# Patient Record
Sex: MALE | Race: OTHER | HISPANIC OR LATINO | Employment: FULL TIME | URBAN - METROPOLITAN AREA
[De-identification: names, ages, dates, MRNs, and addresses within clinical notes are randomized per-mention and may not be internally consistent; named-entity substitution may affect disease eponyms.]

---

## 2023-07-15 ENCOUNTER — HOSPITAL ENCOUNTER (EMERGENCY)
Facility: MEDICAL CENTER | Age: 28
End: 2023-07-15
Attending: STUDENT IN AN ORGANIZED HEALTH CARE EDUCATION/TRAINING PROGRAM

## 2023-07-15 ENCOUNTER — APPOINTMENT (OUTPATIENT)
Dept: RADIOLOGY | Facility: MEDICAL CENTER | Age: 28
End: 2023-07-15
Attending: STUDENT IN AN ORGANIZED HEALTH CARE EDUCATION/TRAINING PROGRAM

## 2023-07-15 VITALS
SYSTOLIC BLOOD PRESSURE: 118 MMHG | DIASTOLIC BLOOD PRESSURE: 80 MMHG | RESPIRATION RATE: 16 BRPM | BODY MASS INDEX: 26.71 KG/M2 | OXYGEN SATURATION: 98 % | TEMPERATURE: 98.1 F | HEART RATE: 60 BPM | HEIGHT: 66 IN | WEIGHT: 166.23 LBS

## 2023-07-15 DIAGNOSIS — R10.9 LEFT SIDED ABDOMINAL PAIN: ICD-10-CM

## 2023-07-15 LAB
ALBUMIN SERPL BCP-MCNC: 4.6 G/DL (ref 3.2–4.9)
ALBUMIN/GLOB SERPL: 1.5 G/DL
ALP SERPL-CCNC: 81 U/L (ref 30–99)
ALT SERPL-CCNC: 19 U/L (ref 2–50)
ANION GAP SERPL CALC-SCNC: 12 MMOL/L (ref 7–16)
APPEARANCE UR: CLEAR
AST SERPL-CCNC: 17 U/L (ref 12–45)
BASOPHILS # BLD AUTO: 0.3 % (ref 0–1.8)
BASOPHILS # BLD: 0.02 K/UL (ref 0–0.12)
BILIRUB SERPL-MCNC: 0.3 MG/DL (ref 0.1–1.5)
BILIRUB UR QL STRIP.AUTO: NEGATIVE
BUN SERPL-MCNC: 16 MG/DL (ref 8–22)
CALCIUM ALBUM COR SERPL-MCNC: 8.7 MG/DL (ref 8.5–10.5)
CALCIUM SERPL-MCNC: 9.2 MG/DL (ref 8.4–10.2)
CHLORIDE SERPL-SCNC: 106 MMOL/L (ref 96–112)
CO2 SERPL-SCNC: 24 MMOL/L (ref 20–33)
COLOR UR: YELLOW
CREAT SERPL-MCNC: 0.89 MG/DL (ref 0.5–1.4)
EOSINOPHIL # BLD AUTO: 0.35 K/UL (ref 0–0.51)
EOSINOPHIL NFR BLD: 4.9 % (ref 0–6.9)
ERYTHROCYTE [DISTWIDTH] IN BLOOD BY AUTOMATED COUNT: 41.8 FL (ref 35.9–50)
GFR SERPLBLD CREATININE-BSD FMLA CKD-EPI: 119 ML/MIN/1.73 M 2
GLOBULIN SER CALC-MCNC: 3.1 G/DL (ref 1.9–3.5)
GLUCOSE SERPL-MCNC: 107 MG/DL (ref 65–99)
GLUCOSE UR STRIP.AUTO-MCNC: NEGATIVE MG/DL
HCT VFR BLD AUTO: 45.5 % (ref 42–52)
HGB BLD-MCNC: 15.5 G/DL (ref 14–18)
IMM GRANULOCYTES # BLD AUTO: 0.01 K/UL (ref 0–0.11)
IMM GRANULOCYTES NFR BLD AUTO: 0.1 % (ref 0–0.9)
KETONES UR STRIP.AUTO-MCNC: NEGATIVE MG/DL
LEUKOCYTE ESTERASE UR QL STRIP.AUTO: NEGATIVE
LIPASE SERPL-CCNC: 38 U/L (ref 7–58)
LYMPHOCYTES # BLD AUTO: 2.83 K/UL (ref 1–4.8)
LYMPHOCYTES NFR BLD: 39.9 % (ref 22–41)
MCH RBC QN AUTO: 30.3 PG (ref 27–33)
MCHC RBC AUTO-ENTMCNC: 34.1 G/DL (ref 32.3–36.5)
MCV RBC AUTO: 88.9 FL (ref 81.4–97.8)
MICRO URNS: NORMAL
MONOCYTES # BLD AUTO: 0.79 K/UL (ref 0–0.85)
MONOCYTES NFR BLD AUTO: 11.1 % (ref 0–13.4)
NEUTROPHILS # BLD AUTO: 3.09 K/UL (ref 1.82–7.42)
NEUTROPHILS NFR BLD: 43.7 % (ref 44–72)
NITRITE UR QL STRIP.AUTO: NEGATIVE
NRBC # BLD AUTO: 0 K/UL
NRBC BLD-RTO: 0 /100 WBC (ref 0–0.2)
PH UR STRIP.AUTO: 7 [PH] (ref 5–8)
PLATELET # BLD AUTO: 249 K/UL (ref 164–446)
PMV BLD AUTO: 10.8 FL (ref 9–12.9)
POTASSIUM SERPL-SCNC: 3.9 MMOL/L (ref 3.6–5.5)
PROT SERPL-MCNC: 7.7 G/DL (ref 6–8.2)
PROT UR QL STRIP: NEGATIVE MG/DL
RBC # BLD AUTO: 5.12 M/UL (ref 4.7–6.1)
RBC UR QL AUTO: NEGATIVE
SODIUM SERPL-SCNC: 142 MMOL/L (ref 135–145)
SP GR UR STRIP.AUTO: 1.01
WBC # BLD AUTO: 7.1 K/UL (ref 4.8–10.8)

## 2023-07-15 PROCEDURE — 96374 THER/PROPH/DIAG INJ IV PUSH: CPT

## 2023-07-15 PROCEDURE — 85025 COMPLETE CBC W/AUTO DIFF WBC: CPT

## 2023-07-15 PROCEDURE — 74176 CT ABD & PELVIS W/O CONTRAST: CPT

## 2023-07-15 PROCEDURE — 81003 URINALYSIS AUTO W/O SCOPE: CPT

## 2023-07-15 PROCEDURE — 71045 X-RAY EXAM CHEST 1 VIEW: CPT

## 2023-07-15 PROCEDURE — 80053 COMPREHEN METABOLIC PANEL: CPT

## 2023-07-15 PROCEDURE — 700111 HCHG RX REV CODE 636 W/ 250 OVERRIDE (IP): Performed by: STUDENT IN AN ORGANIZED HEALTH CARE EDUCATION/TRAINING PROGRAM

## 2023-07-15 PROCEDURE — 99284 EMERGENCY DEPT VISIT MOD MDM: CPT

## 2023-07-15 PROCEDURE — 36415 COLL VENOUS BLD VENIPUNCTURE: CPT

## 2023-07-15 PROCEDURE — 83690 ASSAY OF LIPASE: CPT

## 2023-07-15 RX ORDER — KETOROLAC TROMETHAMINE 30 MG/ML
15 INJECTION, SOLUTION INTRAMUSCULAR; INTRAVENOUS ONCE
Status: COMPLETED | OUTPATIENT
Start: 2023-07-15 | End: 2023-07-15

## 2023-07-15 RX ADMIN — KETOROLAC TROMETHAMINE 15 MG: 30 INJECTION, SOLUTION INTRAMUSCULAR; INTRAVENOUS at 01:29

## 2023-07-15 ASSESSMENT — PAIN DESCRIPTION - PAIN TYPE: TYPE: ACUTE PAIN

## 2023-07-15 NOTE — ED TRIAGE NOTES
"Chief Complaint   Patient presents with    Other     Pt reports pain under Left ribs that wraps around to pts back x 2 months.   Denies problems urination.     /89   Pulse 67   Temp 36.5 °C (97.7 °F) (Temporal)   Resp 16   Ht 1.676 m (5' 6\")   Wt 75.4 kg (166 lb 3.6 oz)   SpO2 97%   BMI 26.83 kg/m²     "

## 2023-07-15 NOTE — DISCHARGE INSTRUCTIONS
Please return to the ER immediately if you have persistent worsening pain, fever, vomiting, inability to eat or drink, blood or black/tarry stools or any new or acute concern.     Please call your PCP to schedule follow-up within one week to ensure your symptoms are improving.      For pain, take Ibuprofen (Motrin, Advil) 600 mg up to every six hours  mg up to every eight hours if your stomach can take it. Instead of Ibuprofen, you can choose Naproxen (Aleve) and take 250-500 mg twice daily. Take Ibuprofen or Naproxen with food to lessen stomach irritation. You may also take Acetaminophen (Tylenol) 500mg (may take up to 1gm) every six hours in addition to Ibuprofen or Naproxen. (Maximum Tylenol 4 gm/day).  I suggest alternating between the ibuprofen and Tylenol every 4 hours for optimal pain relief and adequate spacing of each medication.

## 2023-07-15 NOTE — ED NOTES
* used:  Pt discharged home with instructions to follow up with primary care.  Pt educated on over the counter medications and where to pick them up. The pt denies questions at this time.  Instructed to come back to the ER if symptoms worsen or they feel they are having a medical emergency.  Pt is alert and oriented, speaking in full sentences. Pt ambulates to the waiting area without incident.

## 2023-07-15 NOTE — ED PROVIDER NOTES
"ED Provider Note    CHIEF COMPLAINT  Chief Complaint   Patient presents with    Other     Pt reports pain under Left ribs that wraps around to pts back x 2 months.   Denies problems urination.       EXTERNAL RECORDS REVIEWED  None available    HPI/ROS  LIMITATION TO HISTORY   Select: Language Uzbek,  Used   OUTSIDE HISTORIAN(S):  None    Blu Ravi is a 28 y.o. male who presents with left sided abdominal pain.  He says its been going on for 2 months however has worsened the past 2 days. He had initially described it as under his left ribs in triage but to me describes as involving his whole left flank with radiation into his back.  He denies any testicular pain.  No fevers or chills. He denies any nausea or vomiting.  He has had normal p.o. intake.  He denies any dysuria, hematuria or urgency.  He denies any constipation or diarrhea and has been having normal bowel movements.  He denies any heavy lifting, twisting or any known injuries.  No chest pain or shortness of breath.  He denies any trauma.  He denies any prior abdominal surgical history and denies any history of kidney stones.    PAST MEDICAL HISTORY   Denies    SURGICAL HISTORY  patient denies any surgical history    FAMILY HISTORY  History reviewed. No pertinent family history.    SOCIAL HISTORY  Social History     Tobacco Use    Smoking status: Never    Smokeless tobacco: Never   Vaping Use    Vaping Use: Never used   Substance and Sexual Activity    Alcohol use: Yes     Comment: social drinker    Drug use: Never    Sexual activity: Not on file       CURRENT MEDICATIONS  Home Medications    **Home medications have not yet been reviewed for this encounter**         ALLERGIES  No Known Allergies    PHYSICAL EXAM  VITAL SIGNS: /80   Pulse 60   Temp 36.7 °C (98.1 °F) (Temporal)   Resp 16   Ht 1.676 m (5' 6\")   Wt 75.4 kg (166 lb 3.6 oz)   SpO2 98%   BMI 26.83 kg/m²    Constitutional: Awake and alert . Non toxic  HENT: Normal " inspection  Moist mucous membranes  Eyes: Normal inspection  Neck: Grossly normal range of motion.  Cardiovascular: Normal heart rate, Normal rhythm.  Symmetric peripheral pulses.   Thorax & Lungs: No respiratory distress, No wheezing, No rales, No rhonchi, No chest tenderness.   Abdomen: Soft, non-distended, mild LLQ pain no rebound or guarding, no mass  Skin: No obvious rash.  Extremities: Warm, well perfused. No clubbing, cyanosis, edema   Neurologic: Grossly normal   Psychiatric: Normal for situation      DIAGNOSTIC STUDIES / PROCEDURES      LABS  Results for orders placed or performed during the hospital encounter of 07/15/23   CBC WITH DIFFERENTIAL   Result Value Ref Range    WBC 7.1 4.8 - 10.8 K/uL    RBC 5.12 4.70 - 6.10 M/uL    Hemoglobin 15.5 14.0 - 18.0 g/dL    Hematocrit 45.5 42.0 - 52.0 %    MCV 88.9 81.4 - 97.8 fL    MCH 30.3 27.0 - 33.0 pg    MCHC 34.1 32.3 - 36.5 g/dL    RDW 41.8 35.9 - 50.0 fL    Platelet Count 249 164 - 446 K/uL    MPV 10.8 9.0 - 12.9 fL    Neutrophils-Polys 43.70 (L) 44.00 - 72.00 %    Lymphocytes 39.90 22.00 - 41.00 %    Monocytes 11.10 0.00 - 13.40 %    Eosinophils 4.90 0.00 - 6.90 %    Basophils 0.30 0.00 - 1.80 %    Immature Granulocytes 0.10 0.00 - 0.90 %    Nucleated RBC 0.00 0.00 - 0.20 /100 WBC    Neutrophils (Absolute) 3.09 1.82 - 7.42 K/uL    Lymphs (Absolute) 2.83 1.00 - 4.80 K/uL    Monos (Absolute) 0.79 0.00 - 0.85 K/uL    Eos (Absolute) 0.35 0.00 - 0.51 K/uL    Baso (Absolute) 0.02 0.00 - 0.12 K/uL    Immature Granulocytes (abs) 0.01 0.00 - 0.11 K/uL    NRBC (Absolute) 0.00 K/uL   COMP METABOLIC PANEL   Result Value Ref Range    Sodium 142 135 - 145 mmol/L    Potassium 3.9 3.6 - 5.5 mmol/L    Chloride 106 96 - 112 mmol/L    Co2 24 20 - 33 mmol/L    Anion Gap 12.0 7.0 - 16.0    Glucose 107 (H) 65 - 99 mg/dL    Bun 16 8 - 22 mg/dL    Creatinine 0.89 0.50 - 1.40 mg/dL    Calcium 9.2 8.4 - 10.2 mg/dL    AST(SGOT) 17 12 - 45 U/L    ALT(SGPT) 19 2 - 50 U/L    Alkaline  Phosphatase 81 30 - 99 U/L    Total Bilirubin 0.3 0.1 - 1.5 mg/dL    Albumin 4.6 3.2 - 4.9 g/dL    Total Protein 7.7 6.0 - 8.2 g/dL    Globulin 3.1 1.9 - 3.5 g/dL    A-G Ratio 1.5 g/dL   URINALYSIS    Specimen: Urine   Result Value Ref Range    Color Yellow     Character Clear     Specific Gravity 1.010 <1.035    Ph 7.0 5.0 - 8.0    Glucose Negative Negative mg/dL    Ketones Negative Negative mg/dL    Protein Negative Negative mg/dL    Bilirubin Negative Negative    Nitrite Negative Negative    Leukocyte Esterase Negative Negative    Occult Blood Negative Negative    Micro Urine Req see below    LIPASE   Result Value Ref Range    Lipase 38 7 - 58 U/L   CORRECTED CALCIUM   Result Value Ref Range    Correct Calcium 8.7 8.5 - 10.5 mg/dL   ESTIMATED GFR   Result Value Ref Range    GFR (CKD-EPI) 119 >60 mL/min/1.73 m 2         RADIOLOGY  I have independently interpreted the diagnostic imaging associated with this visit and am waiting the final reading from the radiologist.   My preliminary interpretation is as follows: No infiltrate  Radiologist interpretation:   DX-CHEST-PORTABLE (1 VIEW)   Final Result      No acute cardiopulmonary abnormality.         CT-RENAL COLIC EVALUATION(A/P W/O)   Final Result      No acute abnormality in the abdomen or pelvis. No renal stones or hydronephrosis.            COURSE & MEDICAL DECISION MAKING    ED Observation Status? Yes; I am placing the patient in to an observation status due to a diagnostic uncertainty as well as therapeutic intensity. Patient placed in observation status at 1:20 AM, 7/15/2023.     Observation plan is as follows: IV, Labs, CT, Urine, Serial Exams    Upon Reevaluation, the patient's condition has: Improved; and will be discharged.    Patient discharged from ED Observation status at 2.57 AM 7/15/2023    INITIAL ASSESSMENT, COURSE AND PLAN  Care Narrative: This is a 28-year-old Syrian-speaking male with no chronic medical problems who presents with left-sided  abdominal/flank pain.  He arrives afebrile with normal vital signs and is systemically well-appearing, he is not septic or clinically dehydrated.  His abdomen is benign without rebound, guarding or signs of peritonitis.  Lab work is reassuring he has no leukocytosis, no significant electrolyte derangements, normal renal function testing.  His lipase is normal and he does not have pancreatitis.  Urine without signs of infection.  He has no right upper quadrant tenderness, normal liver function testing and doubt cholecystitis or other biliary etiology.  CT renal colic protocol obtained without any evidence of nephrolithiasis or hydronephrosis.  There were no other acute findings on CT scan such as obvious signs of appendicitis, bowel obstruction, colitis or mass.  Chest x-ray also unremarkable without evidence of rib fracture, pneumothorax or pneumonia.  Patient denies any testicular pain and presentation would be very atypical for testicular torsion and unlikely in this clinical context.  He was given Toradol for pain in the ER.   Patient was reevaluated he continues to have normal vital signs.  Repeat exam is benign. He is not vomiting and is able to tolerate PO.  I do feel reassured by his work-up in the emergency department and think that he can be safely discharged at this time and follow-up as an outpatient.  I referred him to a primary doctor.  He expresses the importance of close outpatient follow-up and will return to the ER sooner for increased pain, vomiting, inability to eat or drink, fevers or any other new or acute concern.    Video  used for all interactions with the patient.    DISPOSITION AND DISCUSSIONS  I have discussed management of the patient with the following physicians and CHUCKY's:  None    Discussion of management with other Q[facility] or appropriate source(s): None     Escalation of care considered, and ultimately not performed:IV fluids. He is able to tolerate PO    Barriers to care at  this time, including but not limited to: Patient does not have established PCP.     Decision tools and prescription drugs considered including, but not limited to: Pain Medications Ibuprofen and Tylenol .    FINAL DIAGNOSIS  1. Left sided abdominal pain Acute          Electronically signed by: More Blanca M.D., 7/15/2023 1:18 AM

## 2023-07-26 ENCOUNTER — TELEPHONE (OUTPATIENT)
Dept: HEALTH INFORMATION MANAGEMENT | Facility: OTHER | Age: 28
End: 2023-07-26